# Patient Record
Sex: FEMALE | Race: AMERICAN INDIAN OR ALASKA NATIVE
[De-identification: names, ages, dates, MRNs, and addresses within clinical notes are randomized per-mention and may not be internally consistent; named-entity substitution may affect disease eponyms.]

---

## 2018-08-08 ENCOUNTER — HOSPITAL ENCOUNTER (EMERGENCY)
Dept: HOSPITAL 5 - ED | Age: 23
Discharge: HOME | End: 2018-08-08
Payer: SELF-PAY

## 2018-08-08 VITALS — SYSTOLIC BLOOD PRESSURE: 134 MMHG | DIASTOLIC BLOOD PRESSURE: 79 MMHG

## 2018-08-08 DIAGNOSIS — Z91.018: ICD-10-CM

## 2018-08-08 DIAGNOSIS — N93.8: Primary | ICD-10-CM

## 2018-08-08 DIAGNOSIS — J45.909: ICD-10-CM

## 2018-08-08 LAB
BILIRUB UR QL STRIP: (no result)
BLOOD UR QL VISUAL: (no result)
MUCOUS THREADS #/AREA URNS HPF: (no result) /HPF
PH UR STRIP: 5 [PH] (ref 5–7)
RBC #/AREA URNS HPF: 5 /HPF (ref 0–6)
UROBILINOGEN UR-MCNC: 2 MG/DL (ref ?–2)
WBC #/AREA URNS HPF: 3 /HPF (ref 0–6)

## 2018-08-08 PROCEDURE — 81025 URINE PREGNANCY TEST: CPT

## 2018-08-08 PROCEDURE — 81001 URINALYSIS AUTO W/SCOPE: CPT

## 2018-08-08 PROCEDURE — 99283 EMERGENCY DEPT VISIT LOW MDM: CPT

## 2018-08-08 NOTE — EMERGENCY DEPARTMENT REPORT
ED Female  HPI





- General


Chief complaint: Vaginal Bleeding


Stated complaint: POSSIBLE MISCARRIAGE


Time Seen by Provider: 08/08/18 14:53


Source: patient


Mode of arrival: Ambulatory


Limitations: No Limitations





- History of Present Illness


Initial comments: 





Patient is a 22-year-old  female who is presenting with lower abdominal 

cramping and some vaginal bleeding.  Patient has soaked through 2 just 2 pads 

today.  Patient had a large clot of blood yesterday comfortable vagina.  

Patient last missed her period was the end of July before she is actually 

bleeding earlier than normal.  Patient states the cramping is a 2 out of 10 in 

severity.  Patient concerned that she may be having a miscarriage.  Patient not 

take a Pregtest at home.





- Related Data


 Previous Rx's











 Medication  Instructions  Recorded  Last Taken  Type


 


Ibuprofen [Motrin] 800 mg PO Q8HR PRN #20 tablet 08/08/18 Unknown Rx


 


medroxyPROGESTERone ACETATE 10 mg PO DAILY #7 tablet 08/08/18 Unknown Rx





[Provera]    











 Allergies











Allergy/AdvReac Type Severity Reaction Status Date / Time


 


mushroom Allergy  Unknown Verified 08/08/18 12:46














ED Review of Systems


ROS: 


Stated complaint: POSSIBLE MISCARRIAGE


Other details as noted in HPI





Comment: All other systems reviewed and negative





ED Past Medical Hx





- Past Medical History


Previous Medical History?: Yes


Hx Asthma: Yes





- Surgical History


Past Surgical History?: Yes


Additional Surgical History: ganglion cyst removed from left hand





- Social History


Smoking Status: Never Smoker


Substance Use Type: None





- Medications


Home Medications: 


 Home Medications











 Medication  Instructions  Recorded  Confirmed  Last Taken  Type


 


Ibuprofen [Motrin] 800 mg PO Q8HR PRN #20 tablet 08/08/18  Unknown Rx


 


medroxyPROGESTERone ACETATE 10 mg PO DAILY #7 tablet 08/08/18  Unknown Rx





[Provera]     














ED Physical Exam





- General


Limitations: No Limitations


General appearance: alert, in no apparent distress





- Head


Head exam: Present: atraumatic, normocephalic





- Eye


Eye exam: Present: normal appearance





- ENT


ENT exam: Present: mucous membranes moist





- Neck


Neck exam: Present: normal inspection





- Respiratory


Respiratory exam: Present: normal lung sounds bilaterally.  Absent: respiratory 

distress, wheezes, rales, rhonchi





- Cardiovascular


Cardiovascular Exam: Present: regular rate, normal rhythm.  Absent: systolic 

murmur, diastolic murmur, rubs, gallop





- GI/Abdominal


GI/Abdominal exam: Present: soft, normal bowel sounds.  Absent: distended, 

tenderness, guarding, rebound





- Extremities Exam


Extremities exam: Present: normal inspection





- Back Exam


Back exam: Present: normal inspection





- Neurological Exam


Neurological exam: Present: alert, oriented X3





- Psychiatric


Psychiatric exam: Present: normal affect, normal mood





- Skin


Skin exam: Present: warm, dry, intact, normal color.  Absent: rash





ED Course





 Vital Signs











  08/08/18





  12:47


 


Temperature 98.6 F


 


Pulse Rate 84


 


Respiratory 18





Rate 


 


Blood Pressure 134/79


 


O2 Sat by Pulse 100





Oximetry 














ED Medical Decision Making





- Lab Data








Labs











  08/08/18





  13:11


 


Urine Color  Yellow


 


Urine Turbidity  Hazy


 


Urine pH  5.0


 


Ur Specific Gravity  1.031 H


 


Urine Protein  30 mg/dl


 


Urine Glucose (UA)  Neg


 


Urine Ketones  20


 


Urine Blood  Mod


 


Urine Nitrite  Neg


 


Urine Bilirubin  Neg


 


Urine Urobilinogen  2.0


 


Ur Leukocyte Esterase  Neg


 


Urine WBC (Auto)  3.0


 


Urine RBC (Auto)  5.0


 


U Epithel Cells (Auto)  1.0


 


Urine Mucus  3+


 


Urine HCG, Qual  Negative














- Medical Decision Making





Patient be started on Provera and given meds for symptomatic relief.  Patient 

is a progress test was negative therefore essentially ruling out bleeding being 

from an ectopic pregnancy.  Patient vital signs of small limits.  Patient does 

not appear pale.  Patient referred to her gynecologist for further management.


Critical care attestation.: 


If time is entered above; I have spent that time in minutes in the direct care 

of this critically ill patient, excluding procedure time.








ED Disposition


Clinical Impression: 


 DUB (dysfunctional uterine bleeding)





Disposition: DC-01 TO HOME OR SELFCARE


Is pt being admited?: No


Does the pt Need Aspirin: No


Condition: Stable


Instructions:  Dysfunctional Uterine Bleeding (ED)


Additional Instructions: 


Please follow-up with your OB/GYN


Prescriptions: 


medroxyPROGESTERone ACETATE [Provera] 10 mg PO DAILY #7 tablet